# Patient Record
Sex: MALE | Race: WHITE
[De-identification: names, ages, dates, MRNs, and addresses within clinical notes are randomized per-mention and may not be internally consistent; named-entity substitution may affect disease eponyms.]

---

## 2018-05-01 ENCOUNTER — HOSPITAL ENCOUNTER (OUTPATIENT)
Dept: HOSPITAL 17 - ESDC | Age: 78
Discharge: HOME | End: 2018-05-01
Attending: SURGERY
Payer: COMMERCIAL

## 2018-05-01 DIAGNOSIS — D17.6: ICD-10-CM

## 2018-05-01 DIAGNOSIS — K40.20: Primary | ICD-10-CM

## 2018-05-01 DIAGNOSIS — K42.9: ICD-10-CM

## 2018-05-01 PROCEDURE — 49652: CPT

## 2018-05-01 PROCEDURE — C1727 CATH, BAL TIS DIS, NON-VAS: HCPCS

## 2018-05-01 PROCEDURE — C1781 MESH (IMPLANTABLE): HCPCS

## 2018-05-01 PROCEDURE — 00840 ANES IPER PX LOWER ABD NOS: CPT

## 2018-05-01 PROCEDURE — 00752 ANES HRNA RPR LMBR&VNT&/DEHS: CPT

## 2018-05-01 PROCEDURE — 49650 LAP ING HERNIA REPAIR INIT: CPT

## 2018-05-01 NOTE — PD.OP
cc:   Constantin Martinez MD


__________________________________________________





Operative Report


Date of Surgery:  May 1, 2018


Preoperative Diagnosis:  


Bilateral inguinal hernias, umbilical hernia


Postoperative Diagnosis:  


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 

umbilical hernia


Procedure:


Laparoscopic repair bilateral inguinal hernias with mesh, umbilical hernia 

repair


Anesthesia:


General


Surgeon:


Constantin Martinez


Assistant(s):


ALLISON Greer


Operation and Findings:


This procedure was assisted by my nurse practitioner.  The skill set of an ARNP 

was medically necessary to provide improved efficiency and safety in the 

completion of this procedure.  The surgical tech was at the back table 

providing appropriate instrumentation while the nurse practitioner directly 

assisted me through the entirety of the procedure.





Indications for procedure


This is a 77-year-old  gentleman with bilateral inguinal hernias and 

umbilical hernia.  He is experiencing intermittent abdominal and groin 

discomfort and desired operative repair.





Intraoperative findings


Large bilateral spermatic cord lipomas through indirect inguinal hernia 

defects.  Small umbilical hernia defect.





Estimated blood loss less than 5 mL.





Description of procedure in detail


The patient was identified as Sidney marshall taken to the operating 

room placed in a supine position.  Sequential compression device were placed on 

bilateral lower extremities.  Following induction of adequate general 

anesthesia the patient's abdomen was prepped and draped in usual sterile 

fashion with Betadine.  A timeout procedure was performed.  Following 

completion timeout procedure everyone's satisfaction within the room local 

anesthetic was infiltrated in the infraumbilical and periumbilical positions.  

Small transverse incision was carried out the scalpel.  Umbilical skin was 

lifted up off of herniated preperitoneal fatty tissue.  Approximate 1 cm 

umbilical hernia defect was identified.  The preperitoneal fatty tissue was 

easily reducible.  The anterior rectus fascia on the left side was identified 

and incised its medial margin and a preperitoneal plane was developed with the 

surgeon's finger directed towards the pubic symphysis.  With the patient in 

slight Trendelenburg position and the preperitoneal dissecting balloon was 

placed in the preperitoneal space and under direct laparoscopic view inflated 

to a total of approximately 30 pumps.  This allowed identification of symphysis 

pubis bilateral Omer's ligaments and inferior epigastric vessels.  2 

infraumbilical midline 5 mm trochars were then placed into the preperitoneal 

space under direct laparoscopic view after incision and skin with a scalpel.  

Attention was turned first to the left side.  Using the blunt graspers blunt 

dissection lateral and posterior the spermatic cord was performed.  The 

anterior medial surface was examined there is an obvious hernia sac attached to 

the anterior medial surface of the spermatic cord and this was reduced to the 

base of spermatic cord using the blunt graspers.  Posterior laterally spermatic 

cord lipomatous material was identified and withdrawn from the inguinal canal.  

There is a moderate to large spermatic cord lipoma.  There was no evidence of 

direct or femoral hernia a 4 x 6" piece of the atrium Prolite mesh was prepared 

with an anterolateral slit placed around the spermatic cord into the position 

and held in position with the tacking device.  Tack was placed to approximate 

the anterolateral slit and on the inferior lateral border of Omer's ligament.

  A 2 x 6" piece of the Prolite mesh was placed across the anterolateral slit 

and help position with the tacking device.  Tacks were placed superior lateral 

inferior medial and superior medial.  Photograph was taken of the completed 

repair.  Attention was then turned to the right side.





Using the blunt graspers similar blunt dissection ensued.  Along the anterior 

medial surface of the spermatic cord adherent peritoneum was reduced to the 

base of spermatic cord.  Posterior laterally a large spermatic cord lipoma was 

withdrawn from the inguinal canal into the preperitoneal space.  Small femoral 

hernia was identified and preperitoneal fatty tissue was withdrawn from the 

femoral canal.  A 4 x 6" piece of atrium Prolite mesh cut with an anterolateral 

slit was placed surrounding the spermatic cord and tacked position with the 

tacking device.  Tacks placed to approximate the anterolateral slit on the 

inferior lateral border of Omer's ligament.  2 x 6" piece of mesh was placed 

across the anterolateral slit and help position with a tacking device.  Tacks 

were placed superior lateral inferior medial and superior medial.  Photograph 

was taken to complete the repair.  Care was taken to avoid tach placement 

inferior laterally on both sides to avoid cutaneous nerve injury.  Remaining 

local anesthetic was placed in the preperitoneal space.  Inferior trocar was 

removed there was no evidence of bleeding from trocar site.  During 

desufflation preperitoneal space the mesh was held against the abdominal wall 

inferior laterally on both sides.  Superior 5 mm trocar was removed and there 

was no evidence of bleeding from the trocar site.  The infra umbilical trocar 

was then removed.  The anterior rectus fascial incision was closed with a 

running 2-0 Vicryl suture.  The umbilical hernia defect which measured slightly 

less than 1 cm in size was then approximated with 3 interrupted inverted 0 

Prolene sutures.  The inward projection of the umbilicus was reformed with 

interrupted 2-0 Vicryl sutures.  2-0 Vicryl was placed in the deep dermis and 

superficial subcutaneous tissue.  Skin was approximated with 4-0 Monocryl 

septic Euler sutures in each incision site.  Mastisol and half-inch brown Steri-

Strips were used to cover the incisions.  4 x 4 gauze was customized in size 

placed within the umbilicus and over the umbilical incision and covered with a 

Tegaderm.  Patient tolerated the procedures without apparent complication.  

Sponge needle and instrument counts were correct at the end of the case.  The 

patient was transported to the PACU in stable condition.  An athletic supporter 

have been placed on the patient in the operating room.











Constantin Martinez MD May 1, 2018 14:54